# Patient Record
Sex: FEMALE | URBAN - METROPOLITAN AREA
[De-identification: names, ages, dates, MRNs, and addresses within clinical notes are randomized per-mention and may not be internally consistent; named-entity substitution may affect disease eponyms.]

---

## 2023-05-12 ENCOUNTER — TELEPHONE ENCOUNTER (OUTPATIENT)
Dept: URBAN - METROPOLITAN AREA CLINIC 92 | Facility: CLINIC | Age: 33
End: 2023-05-12

## 2023-05-17 ENCOUNTER — WEB ENCOUNTER (OUTPATIENT)
Dept: URBAN - METROPOLITAN AREA CLINIC 92 | Facility: CLINIC | Age: 33
End: 2023-05-17

## 2023-05-18 ENCOUNTER — OFFICE VISIT (OUTPATIENT)
Dept: URBAN - METROPOLITAN AREA CLINIC 92 | Facility: CLINIC | Age: 33
End: 2023-05-18
Payer: COMMERCIAL

## 2023-05-18 VITALS
DIASTOLIC BLOOD PRESSURE: 75 MMHG | WEIGHT: 202 LBS | HEIGHT: 69 IN | HEART RATE: 60 BPM | SYSTOLIC BLOOD PRESSURE: 106 MMHG | TEMPERATURE: 97.1 F | BODY MASS INDEX: 29.92 KG/M2

## 2023-05-18 DIAGNOSIS — K58.0 IRRITABLE BOWEL SYNDROME WITH DIARRHEA: ICD-10-CM

## 2023-05-18 DIAGNOSIS — R10.84 ABDOMINAL CRAMPING, GENERALIZED: ICD-10-CM

## 2023-05-18 DIAGNOSIS — R11.2 NAUSEA AND VOMITING, UNSPECIFIED VOMITING TYPE: ICD-10-CM

## 2023-05-18 PROBLEM — 197125005: Status: ACTIVE | Noted: 2023-05-18

## 2023-05-18 PROCEDURE — 99204 OFFICE O/P NEW MOD 45 MIN: CPT | Performed by: INTERNAL MEDICINE

## 2023-05-18 PROCEDURE — 99244 OFF/OP CNSLTJ NEW/EST MOD 40: CPT | Performed by: INTERNAL MEDICINE

## 2023-05-18 RX ORDER — NORETHINDRONE ACETATE AND ETHINYL ESTRADIOL AND FERROUS FUMARATE 1MG-20(21)
TAKE 1 TABLET BY MOUTH EVERY DAY KIT ORAL
Qty: 84 EACH | Refills: 3 | Status: DISCONTINUED | COMMUNITY

## 2023-05-18 NOTE — HPI-TODAY'S VISIT:
31yo F (daughter of my patient) presents for eval of possible gluten sensitivity. She notes for many many years issues with post-prandial abd pain, intermittently and loose stools. Many years ago reports neg celiac serology and she has been 100% gluten free at home - She did 2 food sensitivity tests recently that showed gluten, dairy and soy allergies. Whenever she eats gluten she has abd cramping but feels this sensitivity has increased recently--she has had 2 episodes of N/V and severe cramps with gluten intake at a restaurant recently/ Also gets carolyn-oral rash and swelling of the hands with gluten.  In addition to these gluten episodes also has episodic issues with diarrhea, worse with dinner, about 2/week. Assoc gas, cramps and bloating. Besides these episodes bowels are formed and daily. She is dairy free at home. No hematochezia or melena.  Has heartburn about once every 1-2 weeks. No N/V, dysphagia. No NSAID use. No weight loss. No FH IBD or GI malignancy. Dad with gerd/gastroparesis

## 2023-05-19 ENCOUNTER — P2P PATIENT RECORD (OUTPATIENT)
Age: 33
End: 2023-05-19

## 2023-05-26 LAB
A/G RATIO: 1.8
ALBUMIN: 4.6
ALKALINE PHOSPHATASE: 78
ALT (SGPT): 33
AST (SGOT): 25
BILIRUBIN, TOTAL: 0.7
BUN/CREATININE RATIO: (no result)
BUN: 11
C-REACTIVE PROTEIN, QUANT: 5.7
CALCIUM: 9.6
CARBON DIOXIDE, TOTAL: 26
CHLORIDE: 104
CREATININE: 0.6
DQ2 (DQA1 0501/0505,DQB1 02XX): NEGATIVE
DQ8 (DQA1 03XX, DQB1 0302): NEGATIVE
EGFR: 124
GLOBULIN, TOTAL: 2.6
GLUCOSE: 92
HEMATOCRIT: 41.9
HEMOGLOBIN: 13.8
HLA DQB1*: 501
HLA VARIANTS DETECTED: HLA DQA1*: 5
INTERPRETATION: (no result)
MCH: 31.8
MCHC: 32.9
MCV: 96.5
MPV: 10
PLATELET COUNT: 231
POTASSIUM: 4.8
PROTEIN, TOTAL: 7.2
RDW: 12.7
RED BLOOD CELL COUNT: 4.34
RESULTS REVIEWED BY:: (no result)
SODIUM: 141
WHITE BLOOD CELL COUNT: 6.6

## 2023-07-18 ENCOUNTER — OFFICE VISIT (OUTPATIENT)
Dept: URBAN - METROPOLITAN AREA CLINIC 92 | Facility: CLINIC | Age: 33
End: 2023-07-18
Payer: COMMERCIAL

## 2023-07-18 ENCOUNTER — P2P PATIENT RECORD (OUTPATIENT)
Age: 33
End: 2023-07-18

## 2023-07-18 VITALS
HEART RATE: 65 BPM | BODY MASS INDEX: 30.36 KG/M2 | SYSTOLIC BLOOD PRESSURE: 106 MMHG | WEIGHT: 205 LBS | DIASTOLIC BLOOD PRESSURE: 70 MMHG | HEIGHT: 69 IN | TEMPERATURE: 97 F

## 2023-07-18 DIAGNOSIS — R74.8 ELEVATED LIVER ENZYMES: ICD-10-CM

## 2023-07-18 DIAGNOSIS — K58.0 IRRITABLE BOWEL SYNDROME WITH DIARRHEA: ICD-10-CM

## 2023-07-18 DIAGNOSIS — R19.5 LOOSE STOOLS: ICD-10-CM

## 2023-07-18 DIAGNOSIS — R11.2 ACUTE NAUSEA WITH NONBILIOUS VOMITING: ICD-10-CM

## 2023-07-18 DIAGNOSIS — R10.84 ABDOMINAL CRAMPING, GENERALIZED: ICD-10-CM

## 2023-07-18 PROCEDURE — 99213 OFFICE O/P EST LOW 20 MIN: CPT | Performed by: INTERNAL MEDICINE

## 2023-07-18 NOTE — HPI-TODAY'S VISIT:
33yo F (daughter of my patient) seen in May for eval of possible gluten sensitivity. She noted for many many years issues with post-prandial abd pain, intermittently and loose stools. Many years ago reports neg celiac serology and she has been 100% gluten free at home - She did 2 food sensitivity tests recently that showed gluten, dairy and soy allergies. Whenever she eats gluten she has abd cramping but feels this sensitivity has increased recently--she has had 2 episodes of N/V and severe cramps with gluten intake at a restaurant recently. Also gets carolyn-oral rash and swelling of the hands with gluten.  In addition to these gluten episodes also has episodic issues with diarrhea, worse with dinner, about 2/week. Assoc gas, cramps and bloating. Besides these episodes bowels are formed and daily. She is dairy free at home. No hematochezia or melena.  Has heartburn about once every 1-2 weeks. No N/V, dysphagia. No NSAID use. No weight loss. No FH IBD or GI malignancy. Dad with gerd/gastroparesis Labs as below. Given Xifaxin followed by probiotic and notes while on the ABX her sx were sign better and improved for a month after but over the last couple of weeks notes at night post-prandial cramping and diarrhea 2-3 times/week. Sx wake her up or keep her from going to bed. Can be same foods for dinner she had for lunch w/o sx.  Endocrine recently did TSH

## 2023-09-21 ENCOUNTER — OFFICE VISIT (OUTPATIENT)
Dept: URBAN - METROPOLITAN AREA CLINIC 92 | Facility: CLINIC | Age: 33
End: 2023-09-21

## 2023-09-21 VITALS — HEIGHT: 69 IN

## 2023-09-21 NOTE — HPI-TODAY'S VISIT:
34yo F (daughter of my patient) seen in May for eval of possible gluten sensitivity. She noted for many many years issues with post-prandial abd pain, intermittently and loose stools. Many years ago reports neg celiac serology and she has been 100% gluten free at home - She did 2 food sensitivity tests recently that showed gluten, dairy and soy allergies. Whenever she eats gluten she has abd cramping but feels this sensitivity has increased recently--she has had 2 episodes of N/V and severe cramps with gluten intake at a restaurant recently. Also gets carolyn-oral rash and swelling of the hands with gluten.  In addition to these gluten episodes also has episodic issues with diarrhea, worse with dinner, about 2/week. Assoc gas, cramps and bloating. Besides these episodes bowels are formed and daily. She is dairy free at home. No hematochezia or melena.  Has heartburn about once every 1-2 weeks. No N/V, dysphagia. No NSAID use. No weight loss. No FH IBD or GI malignancy. Dad with gerd/gastroparesis Labs as below. Given Xifaxin followed by probiotic with improvement while on ABX but then recurrent post-prandial ramping and diarrhea 2-3 times/week. Sx wake her up or keep her from going to bed. Can be same foods for dinner she had for lunch w/o sx. Given 2nd course of xifaxin followed by IBgard prior to dinner. FC not done?  Endocrine recently did TSH [place]

## 2023-11-13 ENCOUNTER — DASHBOARD ENCOUNTERS (OUTPATIENT)
Age: 33
End: 2023-11-13

## 2023-11-16 ENCOUNTER — OFFICE VISIT (OUTPATIENT)
Dept: URBAN - METROPOLITAN AREA CLINIC 92 | Facility: CLINIC | Age: 33
End: 2023-11-16

## 2023-11-16 RX ORDER — NORETHINDRONE ACETATE AND ETHINYL ESTRADIOL AND FERROUS FUMARATE 1MG-20(21)
TAKE 1 TABLET BY MOUTH EVERY DAY KIT ORAL
Qty: 84 EACH | Refills: 3 | Status: ACTIVE | COMMUNITY

## 2023-11-16 NOTE — HPI-TODAY'S VISIT:
32yo F (daughter of my patient) seen in May for eval of possible gluten sensitivity. She noted for many many years issues with post-prandial abd pain, intermittently and loose stools. Many years ago reports neg celiac serology and she has been 100% gluten free at home - She did 2 food sensitivity tests recently that showed gluten, dairy and soy allergies. Whenever she eats gluten she has abd cramping but feels this sensitivity has increased recently--she has had 2 episodes of N/V and severe cramps with gluten intake at a restaurant recently. Also gets carolyn-oral rash and swelling of the hands with gluten.  In addition to these gluten episodes also has episodic issues with diarrhea, worse with dinner, about 2/week. Assoc gas, cramps and bloating. Besides these episodes bowels are formed and daily. She is dairy free at home. No hematochezia or melena.  Has heartburn about once every 1-2 weeks. No N/V, dysphagia. No NSAID use. No weight loss. No FH IBD or GI malignancy. Dad with gerd/gastroparesis Labs as below. Given Xifaxin followed by probiotic with improvement while on ABX but then recurrent post-prandial ramping and diarrhea 2-3 times/week. Sx wake her up or keep her from going to bed. Can be same foods for dinner she had for lunch w/o sx. Given 2nd course of xifaxin followed by IBgard prior to dinner. FC not done?  Endocrine recently did TSH [place]